# Patient Record
Sex: FEMALE | Race: WHITE | Employment: OTHER | URBAN - METROPOLITAN AREA
[De-identification: names, ages, dates, MRNs, and addresses within clinical notes are randomized per-mention and may not be internally consistent; named-entity substitution may affect disease eponyms.]

---

## 2023-05-22 RX ORDER — LEVOTHYROXINE SODIUM 0.07 MG/1
75 TABLET ORAL DAILY
COMMUNITY

## 2023-05-22 RX ORDER — AMLODIPINE BESYLATE 10 MG/1
10 TABLET ORAL DAILY
COMMUNITY

## 2023-05-22 RX ORDER — SIMVASTATIN 20 MG
20 TABLET ORAL NIGHTLY
COMMUNITY

## 2023-06-02 ENCOUNTER — ANESTHESIA EVENT (OUTPATIENT)
Dept: SURGERY | Age: 78
End: 2023-06-02
Payer: MEDICARE

## 2023-06-04 ASSESSMENT — LIFESTYLE VARIABLES: SMOKING_STATUS: 0

## 2023-06-05 ENCOUNTER — HOSPITAL ENCOUNTER (OUTPATIENT)
Age: 78
Setting detail: OUTPATIENT SURGERY
Discharge: HOME | End: 2023-06-05
Attending: OPHTHALMOLOGY | Admitting: OPHTHALMOLOGY
Payer: MEDICARE

## 2023-06-05 ENCOUNTER — ANESTHESIA (OUTPATIENT)
Dept: SURGERY | Age: 78
End: 2023-06-05
Payer: MEDICARE

## 2023-06-05 VITALS
SYSTOLIC BLOOD PRESSURE: 121 MMHG | BODY MASS INDEX: 20.55 KG/M2 | RESPIRATION RATE: 15 BRPM | OXYGEN SATURATION: 95 % | HEIGHT: 63 IN | WEIGHT: 116 LBS | TEMPERATURE: 97.6 F | DIASTOLIC BLOOD PRESSURE: 79 MMHG | HEART RATE: 89 BPM

## 2023-06-05 LAB
EKG ATRIAL RATE: 91 BPM
EKG DIAGNOSIS: NORMAL
EKG Q-T INTERVAL: 332 MS
EKG QRS DURATION: 70 MS
EKG QTC CALCULATION (BAZETT): 412 MS
EKG R AXIS: 52 DEGREES
EKG T AXIS: -50 DEGREES
EKG VENTRICULAR RATE: 93 BPM

## 2023-06-05 PROCEDURE — 2500000003 HC RX 250 WO HCPCS: Performed by: OPHTHALMOLOGY

## 2023-06-05 PROCEDURE — 2709999900 HC NON-CHARGEABLE SUPPLY: Performed by: OPHTHALMOLOGY

## 2023-06-05 PROCEDURE — 3600000012 HC SURGERY LEVEL 2 ADDTL 15MIN: Performed by: OPHTHALMOLOGY

## 2023-06-05 PROCEDURE — 7100000011 HC PHASE II RECOVERY - ADDTL 15 MIN: Performed by: OPHTHALMOLOGY

## 2023-06-05 PROCEDURE — 6360000002 HC RX W HCPCS

## 2023-06-05 PROCEDURE — 3700000000 HC ANESTHESIA ATTENDED CARE: Performed by: OPHTHALMOLOGY

## 2023-06-05 PROCEDURE — 7100000010 HC PHASE II RECOVERY - FIRST 15 MIN: Performed by: OPHTHALMOLOGY

## 2023-06-05 PROCEDURE — 3700000001 HC ADD 15 MINUTES (ANESTHESIA): Performed by: OPHTHALMOLOGY

## 2023-06-05 PROCEDURE — 2500000003 HC RX 250 WO HCPCS

## 2023-06-05 PROCEDURE — 2580000003 HC RX 258: Performed by: ANESTHESIOLOGY

## 2023-06-05 PROCEDURE — 6370000000 HC RX 637 (ALT 250 FOR IP): Performed by: OPHTHALMOLOGY

## 2023-06-05 PROCEDURE — 3600000002 HC SURGERY LEVEL 2 BASE: Performed by: OPHTHALMOLOGY

## 2023-06-05 RX ORDER — MIDAZOLAM HYDROCHLORIDE 1 MG/ML
INJECTION INTRAMUSCULAR; INTRAVENOUS PRN
Status: DISCONTINUED | OUTPATIENT
Start: 2023-06-05 | End: 2023-06-05 | Stop reason: SDUPTHER

## 2023-06-05 RX ORDER — SODIUM CHLORIDE 0.9 % (FLUSH) 0.9 %
5-40 SYRINGE (ML) INJECTION PRN
Status: DISCONTINUED | OUTPATIENT
Start: 2023-06-05 | End: 2023-06-05 | Stop reason: HOSPADM

## 2023-06-05 RX ORDER — TETRACAINE HYDROCHLORIDE 5 MG/ML
SOLUTION OPHTHALMIC
Status: COMPLETED | OUTPATIENT
Start: 2023-06-05 | End: 2023-06-05

## 2023-06-05 RX ORDER — SODIUM CHLORIDE 0.9 % (FLUSH) 0.9 %
5-40 SYRINGE (ML) INJECTION EVERY 12 HOURS SCHEDULED
Status: DISCONTINUED | OUTPATIENT
Start: 2023-06-05 | End: 2023-06-05 | Stop reason: HOSPADM

## 2023-06-05 RX ORDER — PROPOFOL 10 MG/ML
INJECTION, EMULSION INTRAVENOUS PRN
Status: DISCONTINUED | OUTPATIENT
Start: 2023-06-05 | End: 2023-06-05 | Stop reason: SDUPTHER

## 2023-06-05 RX ORDER — FENTANYL CITRATE 50 UG/ML
INJECTION, SOLUTION INTRAMUSCULAR; INTRAVENOUS PRN
Status: DISCONTINUED | OUTPATIENT
Start: 2023-06-05 | End: 2023-06-05 | Stop reason: SDUPTHER

## 2023-06-05 RX ORDER — LIDOCAINE HYDROCHLORIDE 20 MG/ML
INJECTION, SOLUTION EPIDURAL; INFILTRATION; INTRACAUDAL; PERINEURAL PRN
Status: DISCONTINUED | OUTPATIENT
Start: 2023-06-05 | End: 2023-06-05 | Stop reason: SDUPTHER

## 2023-06-05 RX ORDER — SODIUM CHLORIDE 9 MG/ML
INJECTION, SOLUTION INTRAVENOUS PRN
Status: DISCONTINUED | OUTPATIENT
Start: 2023-06-05 | End: 2023-06-05 | Stop reason: HOSPADM

## 2023-06-05 RX ADMIN — MIDAZOLAM 0.5 MG: 1 INJECTION INTRAMUSCULAR; INTRAVENOUS at 13:05

## 2023-06-05 RX ADMIN — MIDAZOLAM 0.5 MG: 1 INJECTION INTRAMUSCULAR; INTRAVENOUS at 13:15

## 2023-06-05 RX ADMIN — FENTANYL CITRATE 25 MCG: 50 INJECTION INTRAMUSCULAR; INTRAVENOUS at 13:08

## 2023-06-05 RX ADMIN — SODIUM CHLORIDE: 9 INJECTION, SOLUTION INTRAVENOUS at 11:17

## 2023-06-05 RX ADMIN — PROPOFOL 30 MG: 10 INJECTION, EMULSION INTRAVENOUS at 13:13

## 2023-06-05 RX ADMIN — FENTANYL CITRATE 25 MCG: 50 INJECTION INTRAMUSCULAR; INTRAVENOUS at 13:18

## 2023-06-05 RX ADMIN — LIDOCAINE HYDROCHLORIDE 60 MG: 20 INJECTION, SOLUTION EPIDURAL; INFILTRATION; INTRACAUDAL; PERINEURAL at 13:13

## 2023-06-05 ASSESSMENT — PAIN SCALES - GENERAL
PAINLEVEL_OUTOF10: 0

## 2023-06-05 NOTE — DISCHARGE INSTRUCTIONS
Post-Operative Instructions for Ophthalmic Plastic Surgery      ACTIVITY:     Today, rest quietly at home. Sit upright as much as possible and sleep with your head elevated for 1 week. Do not perform strenuous activity for 1 week. You may take a shower or bath 24 hours after surgery. It is Ok if the sutures get wet. A responsible person must accompany you home. Do not drive for 24 hours. EYE  CARE:   Place baggies of frozen peas or corn on each operated eye for 20 min on and 20 min off while awake; discontinue at bedtime. Do this for 2 days. Apply antibiotic ointment to the sutures twice a day for 2 weeks. Small amounts of bloody drainage may occur after surgery and will usually stop with firm pressure applied for 5 minutes; use a clean washcloth. If this does not stop the bleeding, then call immediately. Some soreness, swelling, bruising, and slightly blurry vision are normal.  If you have decreased vision, excessive swelling or bruising, or bulging forward of the eyeball, then please call immediately. DIET:    You may resume your regular diet. No alcohol for 24 hours. Resume your regular medications. Refer to surgical packet for instructions on when to restart Coumadin, Plavix and aspirin. PAIN:   You may take Tylenol (acetaminophen) for pain. If this does not relieve your pain, then please call. OTHER:   If you experience nausea, vomiting, or excessive pain, please contact your surgeon immediately.       Please call our main number if you have any questions or problems:  [phone]

## 2023-06-05 NOTE — H&P
Date of Surgery Update:  Radha Noriega was seen, history and physical examination reviewed, and patient examined by me today. There have been no significant clinical changes since the completion of the previous history and physical. The surgical site was confirmed by the patient and me. The risk, benefits, and alternatives of the proposed procedure have been explained to the patient (or appropriate guardian) and understanding verbalized. All questions answered. Patient wishes to proceed.     Electronically signed by: Evelyn Cao MD,6/5/2023,12:22 PM

## 2023-06-05 NOTE — OP NOTE
Title of Operation:  Bilateral upper lid functional blepharoplasty, CPT code 18537-93  Indications for Surgery:  66year-old female who presented visually significant bilateral upper lid dermatochalasis, for which medically necessary blepharoplasty was indicated. After benefits, risks and alternatives were discussed, the patient elected to proceed with surgical repair. Preoperative Diagnosis:  Bilateral upper lid dermatochalasis  Postoperative Diagnosis:  Bilateral upper lid dermatochalasis  Anesthesia:   Monitored anesthesia care (MAC) and Local.  Specimen (Bacteriological, Pathological or other):  None  Prosthetic Device/Implant:  None  Surgeon:  Marcelina Coello MD  Assistant:  None  Estimated blood loss:  Less than 5mL  Surgeon's Narrative: The patient was greeted in the preoperative area. The correct place for surgery was identified and marked. The patient was taken back to the operating room and placed in supine position. Time-out for safety was held. The upper eyelid crease and an ellipse of upper lid skin were measured and marked on each side with a marking pen. Intravenous sedation was administered. A 50:50 mix of 2% lidocaine with 1:100,000 epinephrine and 0.75% marcaine was injected in these areas for local anesthesia. The patient was then prepped and draped in the usual sterile fashion. The right upper lid was addressed first. The skin was incised with a #15 blade. The strip of excess skin was removed with Thierry scissors. Careful hemostasis was achieved with bipolar cautery. The orbital septum was incised, the preaponeurotic fat pads were identified and trimmed as needed, with a clamp/cut/cautery technique. The skin was then closed with a running 6-0 plain gut suture. The exact same procedure was performed in the left upper lid. Antibiotic ointment was applied in the eyes and on the incision sites. The patient tolerated the procedure very well. There were no complications.

## 2023-06-05 NOTE — ANESTHESIA POSTPROCEDURE EVALUATION
O/V with Mariano  
Dr. Aquilino Massey, was reached by telephone and indicated that they would be happy to prescribe anticoagulant or refer to a nearby cardiologist. Patient and daughter Latricia Aleman at bedside indicated that they will call Dr. Krystal Martinez office today to arrange cardiology referral.     Patient desirous of discharge with , no additional questions at this time.

## (undated) DEVICE — SUTURE ABSORBABLE MONOFILAMENT 6-0 G-1 18 IN PLN GUT 770G

## (undated) DEVICE — SOLUTION IRRIGATION BAL SALT SOLUTION 15 ML STRL BSS

## (undated) DEVICE — CORD ES L12FT BPLR FRCP

## (undated) DEVICE — INTENDED FOR TISSUE SEPARATION, AND OTHER PROCEDURES THAT REQUIRE A SHARP SURGICAL BLADE TO PUNCTURE OR CUT.: Brand: BARD-PARKER ® STAINLESS STEEL BLADES

## (undated) DEVICE — COVER LT HNDL BLU PLAS

## (undated) DEVICE — SPONGE GZ W4XL4IN COT 12 PLY TYP VII WVN C FLD DSGN STERILE

## (undated) DEVICE — APPLICATOR,COTTON-TIP,WOOD,3,STRL: Brand: MEDLINE

## (undated) DEVICE — WIPE INSTR W73XL73CM VISITEC

## (undated) DEVICE — ENT I-LF: Brand: MEDLINE INDUSTRIES, INC.

## (undated) DEVICE — GLOVE SURG SZ 65 CRM LTX FREE POLYISOPRENE POLYMER BEAD ANTI

## (undated) DEVICE — NEEDLE HYPO 30GA L0.5IN BGE POLYPR HUB S STL REG BVL STR

## (undated) DEVICE — SOLUTION IV IRRIG 250ML ST LF 0.9% SODIUM 2F7122